# Patient Record
Sex: MALE | Race: WHITE | HISPANIC OR LATINO | URBAN - METROPOLITAN AREA
[De-identification: names, ages, dates, MRNs, and addresses within clinical notes are randomized per-mention and may not be internally consistent; named-entity substitution may affect disease eponyms.]

---

## 2019-04-17 ENCOUNTER — HOSPITAL ENCOUNTER (EMERGENCY)
Facility: MEDICAL CENTER | Age: 29
End: 2019-04-17
Attending: EMERGENCY MEDICINE
Payer: OTHER GOVERNMENT

## 2019-04-17 VITALS
TEMPERATURE: 98.2 F | HEART RATE: 60 BPM | SYSTOLIC BLOOD PRESSURE: 131 MMHG | WEIGHT: 167.77 LBS | HEIGHT: 64 IN | RESPIRATION RATE: 16 BRPM | DIASTOLIC BLOOD PRESSURE: 94 MMHG | OXYGEN SATURATION: 94 % | BODY MASS INDEX: 28.64 KG/M2

## 2019-04-17 DIAGNOSIS — L60.0 INGROWN TOENAIL: ICD-10-CM

## 2019-04-17 PROCEDURE — 700101 HCHG RX REV CODE 250: Performed by: EMERGENCY MEDICINE

## 2019-04-17 PROCEDURE — 99283 EMERGENCY DEPT VISIT LOW MDM: CPT

## 2019-04-17 PROCEDURE — 11765 WEDGE EXCISION SKN NAIL FOLD: CPT

## 2019-04-17 RX ORDER — CEPHALEXIN 500 MG/1
500 CAPSULE ORAL 4 TIMES DAILY
Qty: 28 CAP | Refills: 0 | Status: SHIPPED | OUTPATIENT
Start: 2019-04-17 | End: 2019-04-24

## 2019-04-17 RX ORDER — LIDOCAINE HYDROCHLORIDE 10 MG/ML
20 INJECTION, SOLUTION INFILTRATION; PERINEURAL ONCE
Status: COMPLETED | OUTPATIENT
Start: 2019-04-17 | End: 2019-04-17

## 2019-04-17 RX ADMIN — LIDOCAINE HYDROCHLORIDE 20 ML: 10 INJECTION, SOLUTION INFILTRATION; PERINEURAL at 22:30

## 2019-04-17 RX ADMIN — SILVER NITRATE APPLICATORS 1 APPLICATOR: 25; 75 STICK TOPICAL at 23:00

## 2019-04-18 NOTE — DISCHARGE INSTRUCTIONS
Keep the area clean.  Follow-up with the podiatrist for further nail care.  Any redness drainage worsening pain or concerns return.

## 2019-04-18 NOTE — ED PROVIDER NOTES
"ED Provider Note    CHIEF COMPLAINT  Chief Complaint   Patient presents with   • Ingrown Toenail     right foot great toe, ongoing for 2 weeks, no s/s infection       HPI  Aleksander Boyer is a 29 y.o. male who presents with right great toe pain for the last 2 weeks.  He believes he has an ingrown toenail.  He has not had any redness or fevers.  He does have pain especially when he runs.  He denies any drainage from his toe.  The medial aspect of the toenail is slightly swollen.  Patient denies diabetes.    REVIEW OF SYSTEMS  See HPI for further details.  No numbness or tingling.  No fevers.  No recent trauma.  No pain moving his foot.  No coolness to the foot.  No history of high blood sugars.    PAST MEDICAL HISTORY  No past medical history on file.    FAMILY HISTORY  [unfilled]    SOCIAL HISTORY  Social History     Social History   • Marital status:      Spouse name: N/A   • Number of children: N/A   • Years of education: N/A     Social History Main Topics   • Smoking status: Never Smoker   • Smokeless tobacco: Never Used   • Alcohol use Yes      Comment: occ   • Drug use: No   • Sexual activity: Not on file     Other Topics Concern   • Not on file     Social History Narrative   • No narrative on file       SURGICAL HISTORY  No past surgical history on file.    CURRENT MEDICATIONS  Home Medications     Reviewed by Roberto Laws R.N. (Registered Nurse) on 04/17/19 at 2245  Med List Status: Complete   Medication Last Dose Status        Patient Wilman Taking any Medications                       ALLERGIES  No Known Allergies    PHYSICAL EXAM  VITAL SIGNS: /99   Pulse 60   Temp 36.8 °C (98.2 °F) (Temporal)   Resp 16   Ht 1.626 m (5' 4\")   Wt 76.1 kg (167 lb 12.3 oz)   SpO2 99%   BMI 28.80 kg/m²  Room air O2: 99        Constitutional: Well developed, Well nourished, No acute distress, Non-toxic appearance.   Cardiovascular: Good pulses on the affected extremity. Good capillary refill.  Thorax & " Lungs: No respiratory distress  Skin: There is some minimal swelling to the medial aspect of the toenail.  There is no evidence of abscess.  There is no drainage.  There is some minimal erythema.  There is evidence of an ingrown toenail on the medial area  Musculoskeletal: Normal range of motion of the toe  Neurologic: Good sensation to light touch on the distal affected extremity.    COURSE & MEDICAL DECISION MAKING  Pertinent Labs & Imaging studies reviewed. (See chart for details)    Patient presents with an ingrown toenail.  There is some slight erythema but no evidence of cellulitis.  Plan at this time is to place a digital block and remove the ingrown toenail.    Procedure note:  1% lidocaine without approximately 3 cc was used to place a digital block to the toe.  Prior to the injection the toe was cleaned with Betadine.  The medial aspect of the great toenail was removed with forceps.  Silver nitrate was used to control bleeding.  Patient tolerated the procedure well and there is no complications.      Patient was placed on Keflex as prophylaxis.  Patient is advised to follow-up with the podiatry.  Wound care instructions were given.    FINAL IMPRESSION  1. Ingrown toenail  2.   3.         Electronically signed by: Staci Bailey, 4/17/2019 10:42 PM

## 2019-04-18 NOTE — ED TRIAGE NOTES
Aleksander Boyer  29 y.o.  Chief Complaint   Patient presents with   • Ingrown Toenail     right foot great toe, ongoing for 2 weeks, no s/s infection     Patient ambulatory with steady gait to triage room with above complaint.  Patient appears in minor discomfort.  Patient denies injury.  States this happens every year and has been treated in past.  States he is in  and is hurting him when he walks.  No redness, swelling, or drainage noted.     Patient escorted to the lobby and instructed to notify staff of any changes in condition.